# Patient Record
Sex: MALE
[De-identification: names, ages, dates, MRNs, and addresses within clinical notes are randomized per-mention and may not be internally consistent; named-entity substitution may affect disease eponyms.]

---

## 2017-02-28 PROBLEM — S60.211A CONTUSION OF RIGHT WRIST, INITIAL ENCOUNTER: Status: ACTIVE | Noted: 2017-02-28

## 2017-03-27 PROBLEM — Z00.00 ENCOUNTER FOR PREVENTIVE HEALTH EXAMINATION: Status: ACTIVE | Noted: 2017-03-27

## 2017-04-03 ENCOUNTER — APPOINTMENT (OUTPATIENT)
Dept: ORTHOPEDIC SURGERY | Facility: CLINIC | Age: 27
End: 2017-04-03

## 2019-05-20 PROCEDURE — 81003 URINALYSIS AUTO W/O SCOPE: CPT | Performed by: INTERNAL MEDICINE

## 2020-06-12 PROBLEM — L65.9 HAIR LOSS: Status: ACTIVE | Noted: 2020-06-12

## 2020-06-12 PROBLEM — M26.622 ARTHRALGIA OF LEFT TEMPOROMANDIBULAR JOINT: Status: ACTIVE | Noted: 2020-06-12

## 2024-11-28 NOTE — DISCHARGE INSTRUCTIONS
Ear infection care measures   - Take / use antibiotics as directed and to completion   - Ibuprofen or Tylenol as needed for pain   - Avoid having water run into or sit in your ear while you have pain / an infection   - Do not submerge your ear in water until symptoms resolve   - Do not put anything in your ear farther than you can see    - Follow up with your doctor as needed    *After symptoms resolve, you may benefit from ear cleaning by letting warm water run in ears (or using drops of peroxide), letting drain out with gently pulling on ears to open and aim ear at ground, and using a pointed piece of tissue paper to \"wick\" remaining moisture from ear canal

## 2024-11-28 NOTE — ED PROVIDER NOTES
History     Chief Complaint   Patient presents with    Ear Problem Pain       Subjective:   HPI    Ravindra Gallego, 34 year old male with notable medical history of n/a who presents with ear concern. Patient reports sinus congestion / pressure for the past couple days, with patient blowing his nose last night and hearing / feeling a pop to his Left ear with persistent pain and dec hearing. Patient reports having a child in  and s/s similar to past ear infections. Denies fever, chills, ear drainage, sore throat, cough.         Objective:   Past Medical History:    Asthma (HCC)    Kidney stones    Rhabdomyolysis              Past Surgical History:   Procedure Laterality Date    [place] teeth removed                  Social History     Socioeconomic History    Marital status:     Number of children: 0   Tobacco Use    Smoking status: Never    Smokeless tobacco: Never   Vaping Use    Vaping status: Never Used   Substance and Sexual Activity    Alcohol use: No    Drug use: No              Medications Ordered Prior to Encounter[1]      Review of Systems   HENT:  Positive for ear pain and hearing loss.    All other systems reviewed and are negative.        Constitutional and vital signs reviewed.      All other systems reviewed and negative except as noted above.    I have reviewed the family history, social history, allergies, and outpatient medications.     History reviewed from EMR: Encounters, problem list, allergies, medications      Physical Exam     ED Triage Vitals [11/28/24 0847]   /82   Pulse 69   Resp 20   Temp 98.4 °F (36.9 °C)   Temp src Temporal   SpO2 99 %   O2 Device None (Room air)       Current:/82   Pulse 69   Temp 98.4 °F (36.9 °C) (Temporal)   Resp 20   Ht 177.8 cm (5' 10\")   Wt 74.8 kg   SpO2 99%   BMI 23.68 kg/m²       Physical Exam  Vitals and nursing note reviewed.   Constitutional:       General: He is not in acute distress.     Appearance: Normal appearance. He is  normal weight. He is not ill-appearing or toxic-appearing.   HENT:      Head: Normocephalic and atraumatic.      Right Ear: External ear normal.      Left Ear: External ear normal. Tenderness present. Tympanic membrane is injected, erythematous and bulging.      Ears:      Comments: Excess cerumen bilaterally  Left ear with notable erythema and bulging.     Nose: Nose normal. No congestion or rhinorrhea.      Mouth/Throat:      Mouth: Mucous membranes are moist.   Eyes:      Extraocular Movements: Extraocular movements intact.      Conjunctiva/sclera: Conjunctivae normal.      Pupils: Pupils are equal, round, and reactive to light.   Cardiovascular:      Rate and Rhythm: Normal rate.      Pulses: Normal pulses.   Pulmonary:      Effort: Pulmonary effort is normal. No respiratory distress.   Musculoskeletal:         General: No swelling, tenderness or signs of injury. Normal range of motion.      Cervical back: Normal range of motion.   Skin:     General: Skin is warm and dry.      Capillary Refill: Capillary refill takes less than 2 seconds.   Neurological:      General: No focal deficit present.      Mental Status: He is alert and oriented to person, place, and time. Mental status is at baseline.   Psychiatric:         Mood and Affect: Mood normal.         Behavior: Behavior normal.         Thought Content: Thought content normal.         Judgment: Judgment normal.            ED Course     Labs Reviewed - No data to display  No orders to display       Vitals:    11/28/24 0847   BP: 121/82   Pulse: 69   Resp: 20   Temp: 98.4 °F (36.9 °C)   TempSrc: Temporal   SpO2: 99%   Weight: 74.8 kg   Height: 177.8 cm (5' 10\")            MARS Gallego, 34 year old male with medical history as noted above who presents with Left ear pain   - Patient in NAD, VSS   - otitis media vs cerumen vs air/fluid vs other   - HPI and exam c/w otitis media; will treat   - Excess cerumen removed from Left ear with curette   - Supportive  care and infection control measures discussed   - Follow up with primary care provider as needed       ** See ED course below for additional information on care provided / interventions / notable events throughout patient's encounter.         ** I have independently reviewed the radiology images, clinical lab results, and ECG tracings as described above (if applicable)    ** Concerning co-morbidities possibly affecting complaint / care: n/a    ** See below for home care instructions (if applicable)          Medical Decision Making  Risk  OTC drugs.  Prescription drug management.        Disposition and Plan     Clinical Impression:  1. Acute suppurative otitis media of left ear without spontaneous rupture of tympanic membrane, recurrence not specified         Disposition:  Discharge  11/28/2024  8:49 am    Follow-up:  [facility], [address] [facility], [place] [address] [address] [address] [phone]    Primary Care contact to establish care (if needed)          Medications Prescribed:  Current Discharge Medication List        START taking these medications    Details   amoxicillin clavulanate 875-125 MG Oral Tab Take 1 tablet by mouth 2 (two) times daily for 7 days.  Qty: 14 tablet, Refills: 0             The above patient (and/or guardian) was made aware that an appropriate evaluation has been performed, and that no additional testing is required at this time. In my medical judgment, there is currently no evidence of an immediate life-threatening or surgical condition, therefore discharge is indicated at this time. The patient (and/or guardian) was advised that a small risk still exists that a serious condition could develop. The patient was instructed to arrange close follow-up with their primary care provider (or the referral provider given today). The patient received written and verbal instructions regarding their condition / concerns, demonstrated understanding, and  is agreement with the outpatient treatment plan.        Home care instructions:    Ear infection care measures   - Take / use antibiotics as directed and to completion   - Ibuprofen or Tylenol as needed for pain   - Avoid having water run into or sit in your ear while you have pain / an infection   - Do not submerge your ear in water until symptoms resolve   - Do not put anything in your ear farther than you can see    - Follow up with your doctor as needed    *After symptoms resolve, you may benefit from ear cleaning by letting warm water run in ears (or using drops of peroxide), letting drain out with gently pulling on ears to open and aim ear at ground, and using a pointed piece of tissue paper to \"wick\" remaining moisture from ear canal      Riley Gordillo, DNP, APRN, AGACNP-BC, FNP-C, CNL  Adult-Gerontology Acute Care & Family Nurse Practitioner  [facility]                   [1]   No current facility-administered medications on file prior to encounter.     No current outpatient medications on file prior to encounter.